# Patient Record
Sex: FEMALE | Race: WHITE | NOT HISPANIC OR LATINO | URBAN - METROPOLITAN AREA
[De-identification: names, ages, dates, MRNs, and addresses within clinical notes are randomized per-mention and may not be internally consistent; named-entity substitution may affect disease eponyms.]

---

## 2020-09-26 ENCOUNTER — EMERGENCY (EMERGENCY)
Facility: HOSPITAL | Age: 70
LOS: 1 days | Discharge: HOME | End: 2020-09-26
Attending: EMERGENCY MEDICINE | Admitting: EMERGENCY MEDICINE
Payer: MEDICARE

## 2020-09-26 VITALS
DIASTOLIC BLOOD PRESSURE: 69 MMHG | HEART RATE: 75 BPM | SYSTOLIC BLOOD PRESSURE: 106 MMHG | OXYGEN SATURATION: 98 % | TEMPERATURE: 98 F | RESPIRATION RATE: 18 BRPM

## 2020-09-26 VITALS
DIASTOLIC BLOOD PRESSURE: 67 MMHG | HEART RATE: 94 BPM | OXYGEN SATURATION: 98 % | TEMPERATURE: 98 F | WEIGHT: 128.09 LBS | RESPIRATION RATE: 18 BRPM | HEIGHT: 61 IN | SYSTOLIC BLOOD PRESSURE: 130 MMHG

## 2020-09-26 DIAGNOSIS — U07.1 COVID-19: ICD-10-CM

## 2020-09-26 DIAGNOSIS — R07.89 OTHER CHEST PAIN: ICD-10-CM

## 2020-09-26 LAB
ALBUMIN SERPL ELPH-MCNC: 4.5 G/DL — SIGNIFICANT CHANGE UP (ref 3.3–5)
ALP SERPL-CCNC: 94 U/L — SIGNIFICANT CHANGE UP (ref 40–120)
ALT FLD-CCNC: 23 U/L — SIGNIFICANT CHANGE UP (ref 10–45)
ANION GAP SERPL CALC-SCNC: 11 MMOL/L — SIGNIFICANT CHANGE UP (ref 5–17)
APTT BLD: 29.6 SEC — SIGNIFICANT CHANGE UP (ref 27.5–35.5)
AST SERPL-CCNC: 33 U/L — SIGNIFICANT CHANGE UP (ref 10–40)
BASOPHILS # BLD AUTO: 0.03 K/UL — SIGNIFICANT CHANGE UP (ref 0–0.2)
BASOPHILS NFR BLD AUTO: 0.5 % — SIGNIFICANT CHANGE UP (ref 0–2)
BILIRUB SERPL-MCNC: 0.5 MG/DL — SIGNIFICANT CHANGE UP (ref 0.2–1.2)
BUN SERPL-MCNC: 12 MG/DL — SIGNIFICANT CHANGE UP (ref 7–23)
CALCIUM SERPL-MCNC: 9.5 MG/DL — SIGNIFICANT CHANGE UP (ref 8.4–10.5)
CHLORIDE SERPL-SCNC: 104 MMOL/L — SIGNIFICANT CHANGE UP (ref 96–108)
CK MB CFR SERPL CALC: 5.1 NG/ML — SIGNIFICANT CHANGE UP (ref 0–6.7)
CO2 SERPL-SCNC: 30 MMOL/L — SIGNIFICANT CHANGE UP (ref 22–31)
CREAT SERPL-MCNC: 0.82 MG/DL — SIGNIFICANT CHANGE UP (ref 0.5–1.3)
EOSINOPHIL # BLD AUTO: 0.11 K/UL — SIGNIFICANT CHANGE UP (ref 0–0.5)
EOSINOPHIL NFR BLD AUTO: 1.8 % — SIGNIFICANT CHANGE UP (ref 0–6)
GLUCOSE SERPL-MCNC: 96 MG/DL — SIGNIFICANT CHANGE UP (ref 70–99)
HCT VFR BLD CALC: 41.9 % — SIGNIFICANT CHANGE UP (ref 34.5–45)
HGB BLD-MCNC: 13.6 G/DL — SIGNIFICANT CHANGE UP (ref 11.5–15.5)
IMM GRANULOCYTES NFR BLD AUTO: 0.3 % — SIGNIFICANT CHANGE UP (ref 0–1.5)
INR BLD: 0.99 — SIGNIFICANT CHANGE UP (ref 0.88–1.16)
LYMPHOCYTES # BLD AUTO: 1.72 K/UL — SIGNIFICANT CHANGE UP (ref 1–3.3)
LYMPHOCYTES # BLD AUTO: 28.7 % — SIGNIFICANT CHANGE UP (ref 13–44)
MCHC RBC-ENTMCNC: 31 PG — SIGNIFICANT CHANGE UP (ref 27–34)
MCHC RBC-ENTMCNC: 32.5 GM/DL — SIGNIFICANT CHANGE UP (ref 32–36)
MCV RBC AUTO: 95.4 FL — SIGNIFICANT CHANGE UP (ref 80–100)
MONOCYTES # BLD AUTO: 0.52 K/UL — SIGNIFICANT CHANGE UP (ref 0–0.9)
MONOCYTES NFR BLD AUTO: 8.7 % — SIGNIFICANT CHANGE UP (ref 2–14)
NEUTROPHILS # BLD AUTO: 3.59 K/UL — SIGNIFICANT CHANGE UP (ref 1.8–7.4)
NEUTROPHILS NFR BLD AUTO: 60 % — SIGNIFICANT CHANGE UP (ref 43–77)
NRBC # BLD: 0 /100 WBCS — SIGNIFICANT CHANGE UP (ref 0–0)
PLATELET # BLD AUTO: 239 K/UL — SIGNIFICANT CHANGE UP (ref 150–400)
POTASSIUM SERPL-MCNC: 4.2 MMOL/L — SIGNIFICANT CHANGE UP (ref 3.5–5.3)
POTASSIUM SERPL-SCNC: 4.2 MMOL/L — SIGNIFICANT CHANGE UP (ref 3.5–5.3)
PROT SERPL-MCNC: 6.8 G/DL — SIGNIFICANT CHANGE UP (ref 6–8.3)
PROTHROM AB SERPL-ACNC: 11.9 SEC — SIGNIFICANT CHANGE UP (ref 10.6–13.6)
RBC # BLD: 4.39 M/UL — SIGNIFICANT CHANGE UP (ref 3.8–5.2)
RBC # FLD: 11.9 % — SIGNIFICANT CHANGE UP (ref 10.3–14.5)
SARS-COV-2 RNA SPEC QL NAA+PROBE: DETECTED
SODIUM SERPL-SCNC: 145 MMOL/L — SIGNIFICANT CHANGE UP (ref 135–145)
TROPONIN T SERPL-MCNC: <0.01 NG/ML — SIGNIFICANT CHANGE UP (ref 0–0.01)
WBC # BLD: 5.99 K/UL — SIGNIFICANT CHANGE UP (ref 3.8–10.5)
WBC # FLD AUTO: 5.99 K/UL — SIGNIFICANT CHANGE UP (ref 3.8–10.5)

## 2020-09-26 PROCEDURE — 84484 ASSAY OF TROPONIN QUANT: CPT

## 2020-09-26 PROCEDURE — 99284 EMERGENCY DEPT VISIT MOD MDM: CPT | Mod: 25

## 2020-09-26 PROCEDURE — 80053 COMPREHEN METABOLIC PANEL: CPT

## 2020-09-26 PROCEDURE — 71275 CT ANGIOGRAPHY CHEST: CPT | Mod: 26

## 2020-09-26 PROCEDURE — 99285 EMERGENCY DEPT VISIT HI MDM: CPT

## 2020-09-26 PROCEDURE — 71275 CT ANGIOGRAPHY CHEST: CPT

## 2020-09-26 PROCEDURE — 85730 THROMBOPLASTIN TIME PARTIAL: CPT

## 2020-09-26 PROCEDURE — 85025 COMPLETE CBC W/AUTO DIFF WBC: CPT

## 2020-09-26 PROCEDURE — 36415 COLL VENOUS BLD VENIPUNCTURE: CPT

## 2020-09-26 PROCEDURE — 82550 ASSAY OF CK (CPK): CPT

## 2020-09-26 PROCEDURE — 82553 CREATINE MB FRACTION: CPT

## 2020-09-26 PROCEDURE — U0003: CPT

## 2020-09-26 PROCEDURE — 85610 PROTHROMBIN TIME: CPT

## 2020-09-26 RX ORDER — ACETAMINOPHEN 500 MG
650 TABLET ORAL ONCE
Refills: 0 | Status: COMPLETED | OUTPATIENT
Start: 2020-09-26 | End: 2020-09-26

## 2020-09-26 RX ADMIN — Medication 650 MILLIGRAM(S): at 11:37

## 2020-09-26 NOTE — ED ADULT TRIAGE NOTE - CHIEF COMPLAINT QUOTE
69 y/o c/o chest pain, recently with diagnosis of September 9 and treated for pneumonia. Pt denies SOB at this time.

## 2020-09-26 NOTE — ED ADULT NURSE NOTE - OBJECTIVE STATEMENT
pt states "I am here because I have pressure to the left side of my chest and I had covid on 9/9 and I am concerned".

## 2020-09-26 NOTE — ED PROVIDER NOTE - ATTENDING CONTRIBUTION TO CARE
I discussed the plan of care of the patient directly with the PA and examined the patient while in the Emergency Department. I agree with the HPI and PE as documented by the PA.  Pt is a 69yo f, h/o mvp, colonic polyps, pancreatic cysts, bronchiectasis, who p/w intermittent ha and left chest pressure x 3 days. No sob, palp, dizziness, syncope. Was dx'd w/ atypical pna and covid on 9/9, given course of doxycycline x 1 wk. Still reports cough. NO fever/ chills, n/v/d, leg swelling... On exam, pt is afebrile, hds, well appaering. + s1, s2, rrr. Lungs cta b/l. No leg edema. EKG showing nsr w/ r bundle pattern. Labs wnl including trop. Will obtain cta chest to r/o pe given recent covid dx. Will continue to monitor.

## 2020-09-26 NOTE — ED PROVIDER NOTE - PROGRESS NOTE DETAILS
CTA neg for PE, +bronchiectasis. Pt comfortable, satting well. Stable for dc. Return precautions discussed.

## 2020-09-26 NOTE — ED PROVIDER NOTE - CLINICAL SUMMARY MEDICAL DECISION MAKING FREE TEXT BOX
69 yo F with pmh of bronchiectasis, MVP, colon polyps, pancreatic cysts c/o L sided CP since this morning. Pain described pressure and heaviness. Felt like it was hard to take a deep breath. Also reports intermittent HA x 3 days. Took tylenol with relief of symptoms. Pt diagnosed with COVID and atypical pna on 9/7 and completed a course of doxycycline. Denies fever, chills, sob, palpitations, sweats, Le swelling, n/v/d. VSS. PE unremarkable. 69 yo F with pmh of bronchiectasis, MVP, colon polyps, pancreatic cysts c/o L sided CP since this morning. Pain described pressure and heaviness. Felt like it was hard to take a deep breath. Also reports intermittent HA x 3 days. Took tylenol with relief of symptoms. Pt diagnosed with COVID and atypical pna on 9/7 and completed a course of doxycycline. Denies fever, chills, sob, palpitations, sweats, Le swelling, n/v/d. VSS. PE unremarkable. Pain likely secondary to COVID,  r/o acs r/o PE

## 2020-09-26 NOTE — ED PROVIDER NOTE - PATIENT PORTAL LINK FT
You can access the FollowMyHealth Patient Portal offered by [facility] by registering at the following website: [email or web address]. By joining [employer]’s FollowMyHealth portal, you will also be able to view your health information using other applications (apps) compatible with our system.

## 2020-09-26 NOTE — ED PROVIDER NOTE - OBJECTIVE STATEMENT
69 yo F with pmh of bronchiectasis, MVP, colon polyps, pancreatic cysts c/o L sided CP since this morning. Pain described pressure and heaviness 71 yo F with pmh of bronchiectasis, MVP, colon polyps, pancreatic cysts c/o L sided CP since this morning. Pain described pressure and heaviness. Felt like it was hard to take a deep breath. Pt diagnosed with COVID and atypical pna on 9/7 and completed a course of doxycycline. Denies fever, chills, sob, palpitations, sweats. 71 yo F with pmh of bronchiectasis, MVP, colon polyps, pancreatic cysts c/o L sided CP since this morning. Pain described pressure and heaviness. Felt like it was hard to take a deep breath. Also reports intermittent HA x 3 days. Took tylenol with relief of symptoms. Pt diagnosed with COVID and atypical pna on 9/7 and completed a course of doxycycline. Denies fever, chills, sob, palpitations, sweats, Le swelling, n/v/d.

## 2020-09-26 NOTE — ED PROVIDER NOTE - NSFOLLOWUPINSTRUCTIONS_ED_ALL_ED_FT
Chest Pain    Chest pain can be caused by many different conditions which may or may not be dangerous. Causes include heartburn, lung infections, heart attack, blood clot in lungs, skin infections, strain or damage to muscle, cartilage, or bones, etc. In addition to a history and physical examination, an electrocardiogram (ECG) or other lab tests may have been performed to determine the cause of your chest pain. Follow up with your primary care provider or with a cardiologist as instructed.     SEEK IMMEDIATE MEDICAL CARE IF YOU HAVE ANY OF THE FOLLOWING SYMPTOMS: worsening chest pain, coughing up blood, unexplained back/neck/jaw pain, severe abdominal pain, dizziness or lightheadedness, fainting, shortness of breath, sweaty or clammy skin, vomiting, or racing heart beat. These symptoms may represent a serious problem that is an emergency. Do not wait to see if the symptoms will go away. Get medical help right away. Call 911 and do not drive yourself to the hospital.    You have been diagnosed with COVID19 infection (the disease called by the SARS-CoV-2 virus / coronavirus)  RETURN TO THE EMERGENCY DEPARTMENT FOR DIFFICULTY SPEAKING IN FULL SENTENCES, FEELING SHORT OF BREATH WALKING ROOM TO ROOM AT HOME OR UPSTAIRS, OR OTHER CONCERNING SYMPTOMS.     PLEASE CONTINUE TO ISOLATE YOURSELF AT HOME FOR 14 DAYS, OR LONGER IF YOU CONTINUE TO HAVE SYMPTOMS.     YOU MAY DISCONTINUE ISOLATION WHEN:  1. AT LEAST 3 DAYS (72 HOURS) HAVE PASSED SINCE RECOVERY, WHICH IS DEFINED AS HAVING NO FEVER WITHOUT THE USE OF FEVER-REDUCING MEDICATIONS (SUCH AS TYLENOL OR MOTRIN) AND RESPIRATORY SYMPTOMS (COUGH, SHORTNESS OF BREATH), AND  2. AT LEAST 7 DAYS HAVE PASSED SINCE THE SYMPTOMS FIRST BEGAN  BOTH CONDITIONS MUST BE MET TO DISCONTINUE ISOLATION    DO NOT LEAVE HOME. DO NOT TAKE PUBLIC TRANSPORTATION. IF YOU HAVE OTHERS IN YOUR HOME REMAIN 6-10 FEET FROM THEM AND USE THE MASK AT HOME. IF YOU MUST LEAVE THE HOUSE, USE THE MASK.     Check the CDC website (cdc.gov) for updates.    General information for spread of infection:     Avoid close contact with people who are sick.  Avoid touching your eyes, nose, and mouth.  Stay home when you are sick.  Cover your cough or sneeze with a tissue, then throw the tissue in the trash.  Clean and disinfect frequently touched objects and surfaces using a regular household cleaning spray or wipe.  Follow CDC’s recommendations for using a facemask.  CDC does not recommend that people who are well wear a facemask to protect themselves from respiratory diseases, including COVID-19.  Facemasks should be used by people who show symptoms of COVID-19 to help prevent the spread of the disease to  others. The use of facemasks is also crucial for health workers and people who are taking care of someone in close settings (at home or in a health care facility).  Wash your hands often with soap and water for at least 20 seconds, especially after going to the bathroom; before eating; and after blowing your nose, coughing, or sneezing.  If soap and water are not readily available, use an alcohol-based hand  with at least 60% alcohol. Always wash hands with soap and water if hands are visibly dirty.

## 2020-09-26 NOTE — ED ADULT NURSE NOTE - CHPI ED NUR SYMPTOMS NEG
no back pain/no fever/no syncope/no shortness of breath/no chills/no dizziness/no congestion/no nausea/no vomiting/no diaphoresis

## 2022-02-01 ENCOUNTER — EMERGENCY (EMERGENCY)
Facility: HOSPITAL | Age: 72
LOS: 1 days | Discharge: HOME | End: 2022-02-01
Attending: EMERGENCY MEDICINE | Admitting: EMERGENCY MEDICINE
Payer: MEDICARE

## 2022-02-01 VITALS
HEIGHT: 61 IN | HEART RATE: 79 BPM | OXYGEN SATURATION: 96 % | WEIGHT: 119.93 LBS | TEMPERATURE: 97 F | SYSTOLIC BLOOD PRESSURE: 130 MMHG | RESPIRATION RATE: 18 BRPM | DIASTOLIC BLOOD PRESSURE: 71 MMHG

## 2022-02-01 DIAGNOSIS — S09.90XA UNSPECIFIED INJURY OF HEAD, INITIAL ENCOUNTER: ICD-10-CM
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
## 2022-02-01 PROCEDURE — 99285 EMERGENCY DEPT VISIT HI MDM: CPT

## 2022-02-01 PROCEDURE — 70450 CT HEAD/BRAIN W/O DYE: CPT | Mod: 26,MA

## 2022-02-01 PROCEDURE — 70450 CT HEAD/BRAIN W/O DYE: CPT | Mod: MA

## 2022-02-01 PROCEDURE — 72125 CT NECK SPINE W/O DYE: CPT | Mod: MA

## 2022-02-01 PROCEDURE — 99284 EMERGENCY DEPT VISIT MOD MDM: CPT | Mod: 25

## 2022-02-01 PROCEDURE — 72125 CT NECK SPINE W/O DYE: CPT | Mod: 26,MA

## 2022-02-01 NOTE — ED ADULT NURSE NOTE - OBJECTIVE STATEMENT
Pt presents to ED with c/o head injury s/p slip and fall on ice today. Denies taking blood thinners, N/V/D, visual changes, LOC, or neck pain. Pt able to walk after accident. Pt presents to ED with c/o head injury with mild pain to the back of head and neck s/p slip and fall on ice today. Pt states she slipped and landed on her back. Denies taking blood thinners, N/V/D, visual changes, or LOC,. Pt able to walk after accident. No bleeding noted.

## 2022-02-01 NOTE — ED ADULT NURSE NOTE - CHIEF COMPLAINT QUOTE
Pt A&Ox4 c/o mechanical slip on ice with + head injury. Pt denies LOC, blood thinners. No obvious trauma noted.

## 2022-02-01 NOTE — ED PROVIDER NOTE - PATIENT PORTAL LINK FT
You can access the FollowMyHealth Patient Portal offered by [facility] by registering at the following website: [email or web address]. By joining [employer]’s FollowMyHealth portal, you will also be able to view your health information using other applications (apps) compatible with our system.

## 2022-02-01 NOTE — ED PROVIDER NOTE - PHYSICAL EXAMINATION
Constitutional: Well appearing, well nourished, awake, alert, oriented to person, place, time/situation and in no apparent distress.  Head atraumatic, normocephalic. No signs of trauma  ENMT: Airway patent. Normal MM.   Eyes: Clear bilaterally, PERRL, EOMI  Musculoskeletal: Range of motion is not limited. no midline cervical spinal pain w/ palpation. FROM all joints x 4 ext w/o dec ROM, pain, or signs of trauma  Neuro: Alert & Oriented x 3. CN II-XII intact. No facial droop. Clear speech. ZHANG w/ 5/5 strength x 4 ext. Normal sensation. No pronator drift. No dysdidokinesia nor dysmetria. Normal gait  Skin: Skin normal color for race, warm, dry and intact. No evidence of rash.  Psych: Alert and oriented to person, place, time/situation. normal mood and affect.

## 2022-02-01 NOTE — ED PROVIDER NOTE - NSFOLLOWUPINSTRUCTIONS_ED_ALL_ED_FT
Your CT of the brain and cervical spine showed no acute injuries. You may take Tylenol as needed for pain and ice the affected areas as needed for pain. Follow up with your regular medical doctor.     Closed Head Injury    A closed head injury is an injury to your head that may or may not involve a traumatic brain injury (TBI). Symptoms of TBI can be short or long lasting and include headache, dizziness, interference with memory or speech, fatigue, confusion, changes in sleep, mood changes, nausea, depression/anxiety, and dulling of senses. Make sure to obtain proper rest which includes getting plenty of sleep, avoiding excessive visual stimulation, and avoiding activities that may cause physical or mental stress. Avoid any situation where there is potential for another head injury, including sports.    SEEK IMMEDIATE MEDICAL CARE IF YOU HAVE ANY OF THE FOLLOWING SYMPTOMS: unusual drowsiness, vomiting, severe dizziness, seizures, lightheadedness, muscular weakness, different pupil sizes, visual changes, or clear or bloody discharge from your ears or nose. Your CT of the brain and cervical spine showed no acute injuries.    Your CT of the brain had an incidental finding: "There is an approximately 0.6 cm low density   within the left paracentral eric consistent with lacunar infarction versus artifact." Please show these results to your primary medical doctor, so you can have further evaluation of this.    Your CT of the cervical spine showed degenerative changes.    You may take Tylenol as needed for pain and ice the affected areas as needed for pain. Follow up with your regular medical doctor.     Closed Head Injury    A closed head injury is an injury to your head that may or may not involve a traumatic brain injury (TBI). Symptoms of TBI can be short or long lasting and include headache, dizziness, interference with memory or speech, fatigue, confusion, changes in sleep, mood changes, nausea, depression/anxiety, and dulling of senses. Make sure to obtain proper rest which includes getting plenty of sleep, avoiding excessive visual stimulation, and avoiding activities that may cause physical or mental stress. Avoid any situation where there is potential for another head injury, including sports.    SEEK IMMEDIATE MEDICAL CARE IF YOU HAVE ANY OF THE FOLLOWING SYMPTOMS: unusual drowsiness, vomiting, severe dizziness, seizures, lightheadedness, muscular weakness, different pupil sizes, visual changes, or clear or bloody discharge from your ears or nose.

## 2022-02-01 NOTE — ED ADULT TRIAGE NOTE - CHIEF COMPLAINT QUOTE
Pt A&Ox4 c/o mechanical slip on ice with + head injury. Pt denies LOC, blood thinners. Pt A&Ox4 c/o mechanical slip on ice with + head injury. Pt denies LOC, blood thinners. No obvious trauma noted.

## 2022-02-01 NOTE — ED PROVIDER NOTE - CLINICAL SUMMARY MEDICAL DECISION MAKING FREE TEXT BOX
Minor head injury in elderly w/o LOC. No AC use. No signs of trauma. Low suspicion ICH, however given pts age, will get imaging. Declines analgesia. Anticipate dc if CT neg.

## 2022-08-11 NOTE — ED PROVIDER NOTE - DISCHARGE DATE
LVM about scheduling a new patient appt.       Pt seen in hospital and needs fu office appt in next 1-2 weeks ( per Bashir)
01-Feb-2022

## 2023-03-28 NOTE — ED ADULT NURSE NOTE - HISTORY OF COVID-19 VACCINATION
[Normal Appearance] : normal appearance [General Appearance - In No Acute Distress] : no acute distress [Respiration, Rhythm And Depth] : normal respiratory rhythm and effort [Oriented To Time, Place, And Person] : oriented to person, place, and time [Normal Station and Gait] : the gait and station were normal for the patient's age Yes

## 2023-05-18 ENCOUNTER — EMERGENCY (EMERGENCY)
Facility: HOSPITAL | Age: 73
LOS: 1 days | Discharge: HOME | End: 2023-05-18
Attending: STUDENT IN AN ORGANIZED HEALTH CARE EDUCATION/TRAINING PROGRAM | Admitting: STUDENT IN AN ORGANIZED HEALTH CARE EDUCATION/TRAINING PROGRAM
Payer: MEDICARE

## 2023-05-18 VITALS
OXYGEN SATURATION: 99 % | SYSTOLIC BLOOD PRESSURE: 110 MMHG | TEMPERATURE: 98 F | RESPIRATION RATE: 17 BRPM | HEART RATE: 82 BPM | WEIGHT: 117.07 LBS | HEIGHT: 66 IN | DIASTOLIC BLOOD PRESSURE: 72 MMHG

## 2023-05-18 VITALS
RESPIRATION RATE: 18 BRPM | OXYGEN SATURATION: 99 % | DIASTOLIC BLOOD PRESSURE: 65 MMHG | SYSTOLIC BLOOD PRESSURE: 100 MMHG | HEART RATE: 76 BPM

## 2023-05-18 DIAGNOSIS — Z87.09 PERSONAL HISTORY OF OTHER DISEASES OF THE RESPIRATORY SYSTEM: ICD-10-CM

## 2023-05-18 DIAGNOSIS — S09.90XA UNSPECIFIED INJURY OF HEAD, INITIAL ENCOUNTER: ICD-10-CM
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
## 2023-05-18 PROCEDURE — 70450 CT HEAD/BRAIN W/O DYE: CPT | Mod: MA

## 2023-05-18 PROCEDURE — 72125 CT NECK SPINE W/O DYE: CPT | Mod: MA

## 2023-05-18 PROCEDURE — 72125 CT NECK SPINE W/O DYE: CPT | Mod: 26,MA

## 2023-05-18 PROCEDURE — 70450 CT HEAD/BRAIN W/O DYE: CPT | Mod: 26,MA

## 2023-05-18 PROCEDURE — 99284 EMERGENCY DEPT VISIT MOD MDM: CPT | Mod: 25

## 2023-05-18 PROCEDURE — 99284 EMERGENCY DEPT VISIT MOD MDM: CPT

## 2023-05-18 NOTE — ED ADULT NURSE NOTE - OBJECTIVE STATEMENT
73 y.o. Female c/o head strike (back of head) against cabinet door this AM. Denies LOC. Denies use of anticoagulants. States "I was diagnosed with brain abnormalities so I just want to get checked out." No visible bruising or bleeding. Denies headache, dizziness, vision changes, syncope, CP, SOB, numbness/tingling, n/v/d, fevers/chills. Ambulating with steady gait. Per pt she does have some cognitive impairments but A/Ox3 at this time.

## 2023-05-18 NOTE — ED PROVIDER NOTE - PATIENT PORTAL LINK FT
You can access the FollowMyHealth Patient Portal offered by [facility] by registering at the following website: [email or web address]. By joining [employer]’s FollowMyHealth portal, you will also be able to view your health information using other applications (apps) compatible with our system.

## 2023-05-18 NOTE — ED PROVIDER NOTE - PHYSICAL EXAMINATION
general: Well appearing, in no acute distress  HEENT: Normocephalic, atraumatic, extraocular movements intact  CV: Regular rate  Pulm: No respiratory distress, no tachypnea  Abd: Flat, no gross distension  Ext: warm and well perfused  Skin: No gross rashes or lesions  Neuro: Alert and oriented, moving all extremities, motor and sensation intact bilaterally

## 2023-05-18 NOTE — ED PROVIDER NOTE - CLINICAL SUMMARY MEDICAL DECISION MAKING FREE TEXT BOX
73-year-old female presenting after minor head injury, well-appearing, neuro intact, given age, per [place] head CT rule, will obtain CT head and cervical spine, offered pain medication, patient declines.

## 2023-05-18 NOTE — ED PROVIDER NOTE - OBJECTIVE STATEMENT
74 yo F with history of bronchiolectasis presenting after head injury. Pt hit back of head on cabinet, no LOC. Complaining of dull headache. No blurry vision, no nausea or vomiting, no numbness, weakness, tingling. No neck pain. ROS as above.

## 2023-05-18 NOTE — ED PROVIDER NOTE - NSFOLLOWUPINSTRUCTIONS_ED_ALL_ED_FT
Please take tylenol as needed. Return for worsening symptoms, headache, blurry vision, nausea, vomiting, numbness, weakness, tingling. Otherwise, please follow up with your primary physician.     I hope you feel better soon!    Sincerely,  Divine Spaulding MD

## 2023-05-18 NOTE — ED ADULT NURSE NOTE - NSFALLUNIVINTERV_ED_ALL_ED
Bed/Stretcher in lowest position, wheels locked, appropriate side rails in place/Call bell, personal items and telephone in reach/Instruct patient to call for assistance before getting out of bed/chair/stretcher/Non-slip footwear applied when patient is off stretcher/[place] to call system/Physically safe environment - no spills, clutter or unnecessary equipment/Purposeful proactive rounding/Room/bathroom lighting operational, light cord in reach

## 2024-01-09 NOTE — ED PROVIDER NOTE - NS ED ROS FT
Constitutional: No fever or chills  Eyes: No discharge or drainage  Ears, Nose, Mouth, Throat: No nasal discharge, no sore throat  Cardiovascular: No chest pain, no palpitations  Respiratory: No shortness of breath, no cough  Gastrointestinal: No nausea or vomiting, no abdominal pain, no diarrhea or constipation  Musculoskeletal: No joint pain, no swelling  Skin: No rashes or lesions  Neurological: No numbness, weakness, tingling, + headache Home

## 2024-05-09 NOTE — ED PROVIDER NOTE - OBJECTIVE STATEMENT
HR=84 bpm, BVSO=660/70 mmhg, SpO2=93.0 %, Resp=21 B/min, EtCO2=31 mmHg, Apnea=2 Seconds, Pain=6, [place]=10, [place]=2 Pt w/ PMHx bronchiectasis presents s/p head injury. She was walking in the park, slipped on the ice, and fell backwards, hitting the back of her head. No LOC. Pt was helped up by a bystander and walked through the park w/ the bystander, and then home. She called her PCP and was advised to come the ED. She states she has mild HA. No dizziness, n/v, numbness/tingling, nor weakness. When asked if she has neck pain, she reports she had DJD of the neck, and is unable to state if she has pain. No AC use.